# Patient Record
Sex: FEMALE | Race: BLACK OR AFRICAN AMERICAN | NOT HISPANIC OR LATINO | Employment: UNEMPLOYED | URBAN - METROPOLITAN AREA
[De-identification: names, ages, dates, MRNs, and addresses within clinical notes are randomized per-mention and may not be internally consistent; named-entity substitution may affect disease eponyms.]

---
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
## 2022-03-07 ENCOUNTER — TELEPHONE (OUTPATIENT)
Dept: OBSTETRICS AND GYNECOLOGY | Facility: CLINIC | Age: 22
End: 2022-03-07
Payer: MEDICAID

## 2022-03-07 NOTE — TELEPHONE ENCOUNTER
----- Message from Humaira Strong sent at 3/7/2022  2:27 PM CST -----  Patient's mother, Charito, is calling to schedule a nurse appt for the depo shot.  Please call her at [phone].  Thank you!

## 2022-03-09 ENCOUNTER — CLINICAL SUPPORT (OUTPATIENT)
Dept: OBSTETRICS AND GYNECOLOGY | Facility: CLINIC | Age: 22
End: 2022-03-09
Payer: MEDICAID

## 2022-03-09 PROCEDURE — 96372 THER/PROPH/DIAG INJ SC/IM: CPT | Mod: PBBFAC,PN

## 2022-03-09 RX ADMIN — MEDROXYPROGESTERONE ACETATE 150 MG: 150 INJECTION, SUSPENSION INTRAMUSCULAR at 01:03

## 2022-03-09 NOTE — PROGRESS NOTES
Depo-provera 150mg IM Injection given to right upper outer quadrant after two person identification and allergies reviewed injection on time no UPT needed. MD bautista and MAR reviewed patient tolerated well. Dates May 25- June 8, 2022 of next injection given to patient.

## 2022-06-10 ENCOUNTER — CLINICAL SUPPORT (OUTPATIENT)
Dept: OBSTETRICS AND GYNECOLOGY | Facility: CLINIC | Age: 22
End: 2022-06-10
Payer: MEDICAID

## 2022-06-10 DIAGNOSIS — Z30.42 FAMILY PLANNING, DEPO-PROVERA CONTRACEPTION MONITORING/ADMINISTRATION: Primary | ICD-10-CM

## 2022-06-10 LAB
B-HCG UR QL: NEGATIVE
CTP QC/QA: NORMAL

## 2022-06-10 PROCEDURE — 96372 THER/PROPH/DIAG INJ SC/IM: CPT | Mod: PBBFAC,PN

## 2022-06-10 PROCEDURE — 81025 URINE PREGNANCY TEST: CPT | Mod: PBBFAC,PN

## 2022-06-10 RX ADMIN — MEDROXYPROGESTERONE ACETATE 150 MG: 150 INJECTION, SUSPENSION INTRAMUSCULAR at 09:06

## 2022-06-10 NOTE — PROGRESS NOTES
UPT  Negative. Tolerated depo provera injection well. Instructed to return between 8/26 and 9/9 for next injection

## 2022-09-13 ENCOUNTER — CLINICAL SUPPORT (OUTPATIENT)
Dept: OBSTETRICS AND GYNECOLOGY | Facility: CLINIC | Age: 22
End: 2022-09-13
Payer: MEDICAID

## 2022-09-13 DIAGNOSIS — Z30.9 ENCOUNTER FOR CONTRACEPTIVE MANAGEMENT, UNSPECIFIED TYPE: Primary | ICD-10-CM

## 2022-09-13 LAB
B-HCG UR QL: NEGATIVE
CTP QC/QA: YES

## 2022-09-13 PROCEDURE — 96372 THER/PROPH/DIAG INJ SC/IM: CPT | Mod: PBBFAC,PN

## 2022-09-13 PROCEDURE — 81025 URINE PREGNANCY TEST: CPT | Mod: PBBFAC,PN

## 2022-09-13 RX ADMIN — MEDROXYPROGESTERONE ACETATE 150 MG: 150 INJECTION, SUSPENSION INTRAMUSCULAR at 10:09

## 2022-09-13 NOTE — PROGRESS NOTES
Patient arrived early for appointment. Pregnancy test ran. Negative. Allergies verified. Denied any complaints. Advised patient to wait 15 minutes to observe for an allergic reaction. Patient stated no previous allergic reaction to medication. Informed patient on when to return for next shot. Nov. 29 - Dec. 13. Patient verbalized understanding.

## 2022-10-27 ENCOUNTER — TELEPHONE (OUTPATIENT)
Dept: OBSTETRICS AND GYNECOLOGY | Facility: CLINIC | Age: 22
End: 2022-10-27
Payer: MEDICAID

## 2022-10-27 NOTE — TELEPHONE ENCOUNTER
----- Message from Nancy Carter sent at 10/27/2022  1:40 PM CDT -----  Contact: [phone]  Type: Needs Medical Advice  Who Called:  Pt   Symptoms (please be specific):  Abnormal discharge / painful to have a BM   How long has patient had these symptoms:  1 month   Pharmacy name and phone #:    [facility] #[id number] - [place] - [address] [facility] [address]  [facility] [address] [address]  Phone: [phone] Fax: [phone]      Best Call Back Number: [phone]    Additional Information: Pls call back and advise

## 2022-10-27 NOTE — TELEPHONE ENCOUNTER
Spoke with patient and got her an appointment scheduled. Advised pt to try Monistat OTC treatment to help with her symptoms in the mean time. Pt verbalized understanding of appt date/time.

## 2022-11-29 ENCOUNTER — CLINICAL SUPPORT (OUTPATIENT)
Dept: OBSTETRICS AND GYNECOLOGY | Facility: CLINIC | Age: 22
End: 2022-11-29
Payer: MEDICAID

## 2022-11-29 DIAGNOSIS — Z30.9 ENCOUNTER FOR CONTRACEPTIVE MANAGEMENT, UNSPECIFIED TYPE: Primary | ICD-10-CM

## 2022-11-29 PROCEDURE — 96372 THER/PROPH/DIAG INJ SC/IM: CPT | Mod: PBBFAC,PN

## 2022-11-29 RX ADMIN — MEDROXYPROGESTERONE ACETATE 150 MG: 150 INJECTION, SUSPENSION INTRAMUSCULAR at 10:11

## 2022-11-29 NOTE — PROGRESS NOTES
Pt arrived on time for contraception management via depo injection. Pt allergies reviewed, voiced no complaints. Medication adminsitered, pt tolerated well with no adverse reactions noted. Pt advised to return to clinic for next depo dates.  Feb 14-28

## 2023-02-24 ENCOUNTER — CLINICAL SUPPORT (OUTPATIENT)
Dept: OBSTETRICS AND GYNECOLOGY | Facility: CLINIC | Age: 23
End: 2023-02-24
Payer: MEDICAID

## 2023-02-24 PROCEDURE — 96372 THER/PROPH/DIAG INJ SC/IM: CPT | Mod: PBBFAC,PN

## 2023-02-24 PROCEDURE — 99499 UNLISTED E&M SERVICE: CPT | Mod: S$PBB,,, | Performed by: SPECIALIST

## 2023-02-24 PROCEDURE — 99499 NO LOS: ICD-10-PCS | Mod: S$PBB,,, | Performed by: SPECIALIST

## 2023-02-24 RX ADMIN — MEDROXYPROGESTERONE ACETATE 150 MG: 150 INJECTION, SUSPENSION INTRAMUSCULAR at 09:02

## 2023-09-12 ENCOUNTER — OCCUPATIONAL HEALTH (OUTPATIENT)
Dept: URGENT CARE | Facility: CLINIC | Age: 23
End: 2023-09-12
Payer: MEDICAID

## 2023-09-12 DIAGNOSIS — Z13.9 ENCOUNTER FOR SCREENING: Primary | ICD-10-CM

## 2023-09-12 LAB
COLLECTION ONLY: NORMAL
CTP QC/QA: YES
POC 10 PANEL DRUG SCREEN: NEGATIVE

## 2023-09-12 PROCEDURE — 80305 POCT RAPID DRUG SCREEN 10 PANEL: ICD-10-PCS | Mod: S$GLB,,, | Performed by: PHYSICIAN ASSISTANT

## 2023-09-12 PROCEDURE — 80305 DRUG TEST PRSMV DIR OPT OBS: CPT | Mod: S$GLB,,, | Performed by: PHYSICIAN ASSISTANT

## 2023-09-13 ENCOUNTER — OFFICE VISIT (OUTPATIENT)
Dept: OBSTETRICS AND GYNECOLOGY | Facility: CLINIC | Age: 23
End: 2023-09-13
Payer: MEDICAID

## 2023-09-13 DIAGNOSIS — Z30.42 ENCOUNTER FOR SURVEILLANCE OF INJECTABLE CONTRACEPTIVE: Primary | ICD-10-CM

## 2023-09-13 DIAGNOSIS — Z30.9 ENCOUNTER FOR CONTRACEPTIVE MANAGEMENT, UNSPECIFIED TYPE: ICD-10-CM

## 2023-09-13 LAB
B-HCG UR QL: NEGATIVE
CTP QC/QA: YES

## 2023-09-13 PROCEDURE — 96372 THER/PROPH/DIAG INJ SC/IM: CPT | Mod: PBBFAC,PN

## 2023-09-13 PROCEDURE — 99999PBSHW POCT URINE PREGNANCY: Mod: PBBFAC,,,

## 2023-09-13 PROCEDURE — 99499 NO LOS: ICD-10-PCS | Mod: S$PBB,,, | Performed by: SPECIALIST

## 2023-09-13 PROCEDURE — 99999PBSHW PR PBB SHADOW TECHNICAL ONLY FILED TO HB: Mod: PBBFAC,,,

## 2023-09-13 PROCEDURE — 99499 UNLISTED E&M SERVICE: CPT | Mod: S$PBB,,, | Performed by: SPECIALIST

## 2023-09-13 PROCEDURE — 81025 URINE PREGNANCY TEST: CPT | Mod: PBBFAC,PN

## 2023-09-13 PROCEDURE — 99999PBSHW PR PBB SHADOW TECHNICAL ONLY FILED TO HB: ICD-10-PCS | Mod: PBBFAC,,,

## 2023-09-13 RX ADMIN — MEDROXYPROGESTERONE ACETATE 150 MG: 150 INJECTION, SUSPENSION INTRAMUSCULAR at 11:09

## 2023-09-13 NOTE — PROGRESS NOTES
Upt negative, injection given to right upper out quad gluteus. Patient tolerated well. Advised next injection and annual pap due 11/29-12/13/23 verbalized understanding

## 2024-03-19 ENCOUNTER — OFFICE VISIT (OUTPATIENT)
Dept: OBSTETRICS AND GYNECOLOGY | Facility: CLINIC | Age: 24
End: 2024-03-19
Payer: MEDICAID

## 2024-03-19 VITALS
WEIGHT: 141.13 LBS | DIASTOLIC BLOOD PRESSURE: 58 MMHG | BODY MASS INDEX: 25.01 KG/M2 | SYSTOLIC BLOOD PRESSURE: 106 MMHG | HEIGHT: 63 IN

## 2024-03-19 DIAGNOSIS — Z11.3 SCREEN FOR STD (SEXUALLY TRANSMITTED DISEASE): Primary | ICD-10-CM

## 2024-03-19 DIAGNOSIS — N91.2 AMENORRHEA: ICD-10-CM

## 2024-03-19 LAB
B-HCG UR QL: NEGATIVE
CTP QC/QA: YES

## 2024-03-19 PROCEDURE — 99999 PR PBB SHADOW E&M-EST. PATIENT-LVL III: CPT | Mod: PBBFAC,,,

## 2024-03-19 PROCEDURE — 81025 URINE PREGNANCY TEST: CPT | Mod: PBBFAC,PN

## 2024-03-19 PROCEDURE — 99999PBSHW POCT URINE PREGNANCY: Mod: PBBFAC,,,

## 2024-03-19 PROCEDURE — 99213 OFFICE O/P EST LOW 20 MIN: CPT | Mod: PBBFAC,PN

## 2024-03-19 PROCEDURE — 3078F DIAST BP <80 MM HG: CPT | Mod: CPTII,,,

## 2024-03-19 PROCEDURE — 1159F MED LIST DOCD IN RCRD: CPT | Mod: CPTII,,,

## 2024-03-19 PROCEDURE — 87661 TRICHOMONAS VAGINALIS AMPLIF: CPT

## 2024-03-19 PROCEDURE — 3008F BODY MASS INDEX DOCD: CPT | Mod: CPTII,,,

## 2024-03-19 PROCEDURE — 99213 OFFICE O/P EST LOW 20 MIN: CPT | Mod: S$PBB,,,

## 2024-03-19 PROCEDURE — 3074F SYST BP LT 130 MM HG: CPT | Mod: CPTII,,,

## 2024-03-19 NOTE — PROGRESS NOTES
Chief Complaint: STD testing      HPI:   Pt is a 23 y.o. here today desiring STD testing. She has had recent changes in partners and protection not used 100% of the time. Desires screening for possible STDs. Denies vaginal discharge/pain/f/c/n/v. Partner with no reported symptoms. Previously on DMPA, stopped in January. Menstrual cycle has not returned. Declines contraception at this time.  This is the extent of the patient's complaints at this time.     ROS:  GENERAL: No fever, chills, fatigability or weight loss.  VULVAR: No pain, no lesions and no itching.  VAGINAL: No relaxation, no itching, no discharge, no abnormal bleeding and no lesions.  URINARY: No incontinence, no nocturia, no frequency and no dysuria.     PHYSICAL EXAM:  Physical Exam:   Constitutional: She is oriented to person, place, and time. She appears well-developed and well-nourished. No distress.    HENT:   Head: Normocephalic and atraumatic.    Eyes: Conjunctivae and EOM are normal.    Neck: No thyromegaly present.    Cardiovascular:  Normal rate and regular rhythm.             Pulmonary/Chest: Effort normal. No respiratory distress.        Abdominal: Soft. She exhibits no distension. There is no abdominal tenderness.     Genitourinary:    Genitourinary Comments: Deferred, denies any issues               Musculoskeletal: Normal range of motion and moves all extremeties. No tenderness or edema.       Neurological: She is alert and oriented to person, place, and time.    Skin: Skin is warm and dry. No rash noted.    Psychiatric: She has a normal mood and affect. Her behavior is normal. Judgment normal.     History reviewed. No pertinent past medical history.    History reviewed. No pertinent surgical history.    History reviewed. No pertinent family history.    Social History     Socioeconomic History    Marital status: Single   Tobacco Use    Smoking status: Never    Smokeless tobacco: Never   Substance and Sexual Activity    Alcohol use:  Never    Drug use: Never    Sexual activity: Yes     Partners: Male     Birth control/protection: Injection       No current outpatient medications on file.     No current facility-administered medications for this visit.       Review of patient's allergies indicates:  No Known Allergies       OB History    Para Term  AB Living   0 0 0 0 0 0   SAB IAB Ectopic Multiple Live Births   0 0 0 0 0          Assessment/Plan:    Screen for STD (sexually transmitted disease)  -     Trichomonas vaginalis, RNA, Qual, Urine  -     C. trachomatis/N. gonorrhoeae by AMP DNA [facility]; Urine    Amenorrhea  -     POCT Urine Pregnancy        Will call with results. Recommended Condom use 100% to prevent STDs    RTC prn and for annual

## 2024-03-20 ENCOUNTER — HOSPITAL ENCOUNTER (EMERGENCY)
Facility: HOSPITAL | Age: 24
Discharge: HOME | End: 2024-03-20
Attending: EMERGENCY MEDICINE
Payer: MEDICAID

## 2024-03-20 ENCOUNTER — TELEPHONE (OUTPATIENT)
Dept: OBSTETRICS AND GYNECOLOGY | Facility: CLINIC | Age: 24
End: 2024-03-20
Payer: MEDICAID

## 2024-03-20 VITALS
OXYGEN SATURATION: 99 % | HEART RATE: 72 BPM | RESPIRATION RATE: 18 BRPM | BODY MASS INDEX: 24.8 KG/M2 | SYSTOLIC BLOOD PRESSURE: 110 MMHG | WEIGHT: 140 LBS | DIASTOLIC BLOOD PRESSURE: 60 MMHG | TEMPERATURE: 98 F | HEIGHT: 63 IN

## 2024-03-20 DIAGNOSIS — R00.2 PALPITATIONS: ICD-10-CM

## 2024-03-20 LAB
ALBUMIN SERPL BCP-MCNC: 4.5 G/DL (ref 3.5–5.2)
ALP SERPL-CCNC: 67 U/L (ref 55–135)
ALT SERPL W/O P-5'-P-CCNC: 10 U/L (ref 10–44)
ANION GAP SERPL CALC-SCNC: 14 MMOL/L (ref 8–16)
AST SERPL-CCNC: 14 U/L (ref 10–40)
B-HCG UR QL: NEGATIVE
BASOPHILS # BLD AUTO: 0.02 K/UL (ref 0–0.2)
BASOPHILS NFR BLD: 0.3 % (ref 0–1.9)
BILIRUB SERPL-MCNC: 0.9 MG/DL (ref 0.1–1)
BUN SERPL-MCNC: 10 MG/DL (ref 6–20)
CALCIUM SERPL-MCNC: 9.7 MG/DL (ref 8.7–10.5)
CHLORIDE SERPL-SCNC: 106 MMOL/L (ref 95–110)
CO2 SERPL-SCNC: 20 MMOL/L (ref 23–29)
CREAT SERPL-MCNC: 0.8 MG/DL (ref 0.5–1.4)
CTP QC/QA: YES
DIFFERENTIAL METHOD BLD: NORMAL
EOSINOPHIL # BLD AUTO: 0.1 K/UL (ref 0–0.5)
EOSINOPHIL NFR BLD: 0.9 % (ref 0–8)
ERYTHROCYTE [DISTWIDTH] IN BLOOD BY AUTOMATED COUNT: 12.3 % (ref 11.5–14.5)
EST. GFR  (NO RACE VARIABLE): >60 ML/MIN/1.73 M^2
GLUCOSE SERPL-MCNC: 85 MG/DL (ref 70–110)
HCT VFR BLD AUTO: 40.5 % (ref 37–48.5)
HGB BLD-MCNC: 13.6 G/DL (ref 12–16)
IMM GRANULOCYTES # BLD AUTO: 0.01 K/UL (ref 0–0.04)
IMM GRANULOCYTES NFR BLD AUTO: 0.2 % (ref 0–0.5)
LYMPHOCYTES # BLD AUTO: 2.7 K/UL (ref 1–4.8)
LYMPHOCYTES NFR BLD: 46 % (ref 18–48)
MCH RBC QN AUTO: 30.9 PG (ref 27–31)
MCHC RBC AUTO-ENTMCNC: 33.6 G/DL (ref 32–36)
MCV RBC AUTO: 92 FL (ref 82–98)
MONOCYTES # BLD AUTO: 0.3 K/UL (ref 0.3–1)
MONOCYTES NFR BLD: 5.8 % (ref 4–15)
NEUTROPHILS # BLD AUTO: 2.7 K/UL (ref 1.8–7.7)
NEUTROPHILS NFR BLD: 46.8 % (ref 38–73)
NRBC BLD-RTO: 0 /100 WBC
PLATELET # BLD AUTO: 257 K/UL (ref 150–450)
PMV BLD AUTO: 9.4 FL (ref 9.2–12.9)
POTASSIUM SERPL-SCNC: 4.4 MMOL/L (ref 3.5–5.1)
PROT SERPL-MCNC: 7.9 G/DL (ref 6–8.4)
RBC # BLD AUTO: 4.4 M/UL (ref 4–5.4)
SODIUM SERPL-SCNC: 140 MMOL/L (ref 136–145)
TROPONIN I SERPL DL<=0.01 NG/ML-MCNC: <0.006 NG/ML (ref 0–0.03)
TSH SERPL DL<=0.005 MIU/L-ACNC: 1.71 UIU/ML (ref 0.4–4)
WBC # BLD AUTO: 5.82 K/UL (ref 3.9–12.7)

## 2024-03-20 PROCEDURE — 84443 ASSAY THYROID STIM HORMONE: CPT | Performed by: NURSE PRACTITIONER

## 2024-03-20 PROCEDURE — 85025 COMPLETE CBC W/AUTO DIFF WBC: CPT | Performed by: NURSE PRACTITIONER

## 2024-03-20 PROCEDURE — 93005 ELECTROCARDIOGRAM TRACING: CPT

## 2024-03-20 PROCEDURE — 80053 COMPREHEN METABOLIC PANEL: CPT | Performed by: NURSE PRACTITIONER

## 2024-03-20 PROCEDURE — 93010 ELECTROCARDIOGRAM REPORT: CPT | Mod: ,,, | Performed by: GENERAL PRACTICE

## 2024-03-20 PROCEDURE — 84484 ASSAY OF TROPONIN QUANT: CPT | Performed by: NURSE PRACTITIONER

## 2024-03-20 PROCEDURE — 81025 URINE PREGNANCY TEST: CPT | Performed by: NURSE PRACTITIONER

## 2024-03-20 PROCEDURE — 99285 EMERGENCY DEPT VISIT HI MDM: CPT | Mod: 25

## 2024-03-20 PROCEDURE — 36415 COLL VENOUS BLD VENIPUNCTURE: CPT | Performed by: NURSE PRACTITIONER

## 2024-03-20 RX ORDER — FAMOTIDINE 20 MG/1
20 TABLET, FILM COATED ORAL 2 TIMES DAILY
Qty: 20 TABLET | Refills: 0 | Status: SHIPPED | OUTPATIENT
Start: 2024-03-20 | End: 2025-03-20

## 2024-03-20 NOTE — TELEPHONE ENCOUNTER
"----- Message from Mally Pearl NP sent at 3/20/2024 11:25 AM CDT -----  Regarding: FW: Cancellation of Order # [id number]  Please let her know there was not enough urine to run the test for gonorrhea and chlamydia. If she still would like testing, she can come drop off another urine specimen  ----- Message -----  From: [employer]  Sent: 3/20/2024  12:49 AM CDT  To: Mally Pearl NP  Subject: Cancellation of Order # [id number]               Order number [id number] for the procedure C. TRACHOMATIS/N.   GONORRHOEAE BY AMP DNA [[id number]] has been canceled by [employer] [[id number]]. This procedure was ordered by Mally Pearl NP [[id number]] on Mar 19, 2024 for the patient Nilda Ordaz [[id number]]. The reason for cancellation was "Other".    "

## 2024-03-20 NOTE — TELEPHONE ENCOUNTER
----- Message from Annette Ordaz sent at 3/20/2024  4:21 AM CDT -----  Regarding: Lab Client Services  Contact: [phone]  Good Morning,     My name is Annette Ordaz, I work in the Lab Client Services. We had a problem with some lab work on this patient. If someone from your office could call us at [phone] or ext. [phone] that would be great. Anyone in my department can help.      Thank you,

## 2024-03-20 NOTE — TELEPHONE ENCOUNTER
Informed pt that she did not supply enough urine for testing  Pt stated that she would stop by tomorrow to leave a urine sample  Pt verb understanding

## 2024-03-20 NOTE — FIRST PROVIDER EVALUATION
Emergency Department TeleTriage Encounter Note      CHIEF COMPLAINT    Chief Complaint   Patient presents with    Palpitations     Palpitations when she lays down and feels her heart racing at random times        VITAL SIGNS   Initial Vitals [03/20/24 1820]   BP Pulse Resp Temp SpO2   (!) 114/56 77 20 98 °F (36.7 °C) 100 %      MAP       --            ALLERGIES    Review of patient's allergies indicates:  No Known Allergies    PROVIDER TRIAGE NOTE  23-year-old female presents to the ER today with complaints of shortness of breath and lightheadedness symptoms especially with lying down and position changes and a recent bout of diarrhea.  She states her symptoms began approximately 3 days ago.  She states she does not have any chest pain but has palpitations and feels like her heart is racing.  She recently discontinued her Depo-Provera injection and wonders if possibly this could be attributing to her symptoms.  No history of DVT or PE.  Denies any chest pain leg swelling leg pain coagulopathies or other complaints or concerns.  No abdominal pain.  No dysuria.  Last menstrual cycle was approximately 6 months ago.     AAOx3, respirations even and non- labored, stable vitals, normal coloration of skin, sitting upright in triage chair, appears in no acute distress.          ORDERS  Labs Reviewed - No data to display    ED Orders (720h ago, onward)      None              Virtual Visit Note: The provider triage portion of this emergency department evaluation and documentation was performed via [employer], a HIPAA-compliant telemedicine application, in concert with a tele-presenter in the room. A face to face patient evaluation with one of my colleagues will occur once the patient is placed in an emergency department room.      DISCLAIMER: This note was prepared with [employer] voice recognition transcription software. Garbled syntax, mangled pronouns, and other bizarre constructions may be attributed to that software  system.

## 2024-03-21 LAB
OHS QRS DURATION: 86 MS
OHS QTC CALCULATION: 429 MS
SPECIMEN SOURCE: NORMAL
T VAGINALIS RRNA SPEC QL NAA+PROBE: NEGATIVE

## 2024-03-21 NOTE — ED PROVIDER NOTES
Encounter Date: 3/20/2024       History     Chief Complaint   Patient presents with    Palpitations     Palpitations when she lays down and feels her heart racing at random times      HPI patient is a 23-year-old woman who presents emergency department for evaluation of palpitations.  She states she gets a nightly over the past week after laying down.  It is described as feeling her heart beating hard and racing.  She has associated shortness of breath and chest heaviness when this happens.  Prior to this proximally 2 weeks ago she states she thinks she would food poisoning because she had dizziness, vomiting and diarrhea which has subsided.  She does state that she occasionally feels lightheaded with the palpitations.  No history of sudden death in the family.  Last period was approximately 6 months ago.  Last Depo shot was around November.  Review of patient's allergies indicates:  No Known Allergies  No past medical history on file.  No past surgical history on file.  No family history on file.  Social History     Tobacco Use    Smoking status: Never    Smokeless tobacco: Never   Substance Use Topics    Alcohol use: Never    Drug use: Never     Review of Systems   Constitutional:  Negative for fever.   HENT:  Negative for sore throat.    Respiratory:  Positive for shortness of breath.    Cardiovascular:  Positive for chest pain and palpitations.   Gastrointestinal:  Negative for nausea.   Genitourinary:  Negative for dysuria.   Musculoskeletal:  Negative for back pain.   Skin:  Negative for rash.   Neurological:  Positive for dizziness and light-headedness. Negative for weakness.   Hematological:  Does not bruise/bleed easily.       Physical Exam     Initial Vitals [03/20/24 1820]   BP Pulse Resp Temp SpO2   (!) 114/56 77 20 98 °F (36.7 °C) 100 %      MAP       --         Physical Exam    Constitutional: Vital signs are normal. She appears well-developed and well-nourished.  Non-toxic appearance. No distress.    HENT:   Head: Normocephalic and atraumatic.   Eyes: EOM are normal. Pupils are equal, round, and reactive to light.   Neck: Neck supple. No JVD present.   Normal range of motion.  Cardiovascular:  Normal rate, regular rhythm, normal heart sounds and intact distal pulses.     Exam reveals no gallop and no friction rub.       No murmur heard.  Pulmonary/Chest: Breath sounds normal. She has no wheezes. She has no rhonchi. She has no rales.   Abdominal: Abdomen is soft. Bowel sounds are normal. There is no abdominal tenderness. There is no rebound and no guarding.   Musculoskeletal:         General: Normal range of motion.      Cervical back: Normal range of motion and neck supple. No rigidity.     Neurological: She is alert and oriented to person, place, and time. She has normal strength and normal reflexes. No cranial nerve deficit or sensory deficit. She exhibits normal muscle tone. Coordination normal. GCS eye subscore is 4. GCS verbal subscore is 5. GCS motor subscore is 6.   Skin: Skin is warm and dry.   Psychiatric: She has a normal mood and affect. Her speech is normal and behavior is normal. She is not actively hallucinating.         ED Course   Procedures  Labs Reviewed   COMPREHENSIVE METABOLIC PANEL - Abnormal; Notable for the following components:       Result Value    CO2 20 (*)     All other components within normal limits   CBC W/ AUTO DIFFERENTIAL   TSH   TROPONIN I   POCT URINE PREGNANCY     EKG Readings: (Independently Interpreted)   Initial Reading: No STEMI.   Normal sinus rhythm with sinus arrhythmia.  70 beats per minute.  Normal T-waves, normal ST segments       Imaging Results              X-Ray Chest PA And Lateral (Final result)  Result time 03/20/24 18:47:37      Final result by Markell Cedillo MD (03/20/24 18:47:37)                   Narrative:    EXAMINATION:  XR CHEST PA AND LATERAL    CLINICAL HISTORY:  Palpitations    TECHNIQUE:  PA and lateral views of the chest were  performed.    COMPARISON:  None    FINDINGS:  Lungs are clear.  Normal size heart.  Pleural effusion or pneumothorax.  No acute osseous finding.  Chest wall skin piercings.      Electronically signed by: Markell Cedillo  Date:    03/20/2024  Time:    18:47                                     Medications - No data to display  Medical Decision Making  23-year-old woman presents ED for evaluation of palpitations.  She has had some mild associated dizziness, nausea vomiting diarrhea prior to this.  Differential diagnosis includes electrolyte abnormality, arrhythmia, dehydration.  Patient has no tachycardia or hypotension.  She has not hypoxic, have low suspicion for pulmonary embolism.  EKG shows no evidence of malignant cardiac arrhythmias.  She has not anemic with a normal hemoglobin 13.6.  There are no electrolyte abnormalities.  Thyroid is normal.  Troponin is negative.  Chest x-rays interpreted by myself showing no acute abnormalities.  Patient's cell for discharge home.  Symptoms tend to occur at night when she lays down raising suspicion for GERD.  Will start on Pepcid and educated about foods to avoid.  PCP follow-up.  Return precautions discussed.    Amount and/or Complexity of Data Reviewed  Labs:  Decision-making details documented in ED Course.               ED Course as of 03/21/24 0542   Wed Mar 20, 2024   2044 TSH: 1.712 [AS]   2045 hCG Qualitative, Urine: Negative [AS]   2045 Potassium: 4.4 [AS]   2045 Sodium: 140 [AS]   2045 Glucose: 85 [AS]   2045 Creatinine: 0.8 [AS]   2045 Hematocrit: 40.5 [AS]      ED Course User Index  [AS] Wero Pollock MD                           Clinical Impression:  Final diagnoses:  [R00.2] Palpitations          ED Disposition Condition    Discharge Stable          ED Prescriptions       Medication Sig Dispense Start Date End Date Auth. Provider    famotidine (PEPCID) 20 MG tablet Take 1 tablet (20 mg total) by mouth 2 (two) times daily. 20 tablet 3/20/2024 3/20/2025  Wero Pollock MD          Follow-up Information       Follow up With Specialties Details Why Contact Info Additional Information    Jeansonne, Cornel J., MD Pediatrics Schedule an appointment as soon as possible for a visit   [address] [facility] [address] [phone]       [facility]  As needed, If symptoms worsen [address] [place] [address] [address] [place] [place] [address] 1st floor             Wero Pollock MD  03/21/24 8011

## 2024-05-26 ENCOUNTER — HOSPITAL ENCOUNTER (EMERGENCY)
Facility: HOSPITAL | Age: 24
Discharge: HOME | End: 2024-05-26
Attending: STUDENT IN AN ORGANIZED HEALTH CARE EDUCATION/TRAINING PROGRAM
Payer: MEDICAID

## 2024-05-26 VITALS
HEIGHT: 63 IN | RESPIRATION RATE: 18 BRPM | WEIGHT: 140 LBS | HEART RATE: 95 BPM | TEMPERATURE: 99 F | DIASTOLIC BLOOD PRESSURE: 73 MMHG | BODY MASS INDEX: 24.8 KG/M2 | OXYGEN SATURATION: 100 % | SYSTOLIC BLOOD PRESSURE: 121 MMHG

## 2024-05-26 DIAGNOSIS — Z78.9 ALCOHOL USE: ICD-10-CM

## 2024-05-26 DIAGNOSIS — R00.2 PALPITATION: Primary | ICD-10-CM

## 2024-05-26 PROCEDURE — 93005 ELECTROCARDIOGRAM TRACING: CPT

## 2024-05-26 PROCEDURE — 93010 ELECTROCARDIOGRAM REPORT: CPT | Mod: ,,, | Performed by: INTERNAL MEDICINE

## 2024-05-26 PROCEDURE — 99283 EMERGENCY DEPT VISIT LOW MDM: CPT | Mod: 25

## 2024-05-26 NOTE — ED NOTES
"Nilda presents to the ED with palpitations that started today.  Pt stated "I think its because I was drinking all day".  Pt reports that she started drinking around 12pm yesterday and has not had any alcohol today.  No other complaints at this time. Bed low and locked. Pt connected to BP cuff and pulse ox.  Call light within reach.     "

## 2024-05-26 NOTE — ED NOTES
Discharge instructions reviewed with pt. Pt instructed to follow up as advised and return to the ED for worsening of symptoms. Pt awake, alert and ambulatory on ED discharge, no acute distress noted.

## 2024-05-26 NOTE — DISCHARGE INSTRUCTIONS
Follow up primary care physician for further management evaluation.  May benefit from Holter monitor.  Return to ED for any worsening symptoms

## 2024-05-26 NOTE — ED TRIAGE NOTES
Nilda Ordaz is here with palpitations that started tonight and abdominal pain also, states out drinking last night and it feels 'bubbly'

## 2024-05-26 NOTE — ED PROVIDER NOTES
Encounter Date: 5/26/2024       History     Chief Complaint   Patient presents with    Palpitations     And abdominal pain      23-year-old female presents with palpitations.  Associated epigastric discomfort, occurred after drinking alcohol.  Patient reports similar symptoms within the last couple months after drinking alcohol as well.  No associated shortness of breath.    The history is provided by the patient.     Review of patient's allergies indicates:  No Known Allergies  History reviewed. No pertinent past medical history.  History reviewed. No pertinent surgical history.  No family history on file.  Social History     Tobacco Use    Smoking status: Never    Smokeless tobacco: Never   Substance Use Topics    Alcohol use: Never    Drug use: Never     Review of Systems   All other systems reviewed and are negative.      Physical Exam     Initial Vitals [05/26/24 0029]   BP Pulse Resp Temp SpO2   118/69 85 18 98.5 °F (36.9 °C) 100 %      MAP       --         Physical Exam    Nursing note and vitals reviewed.  Constitutional: No distress.   HENT:   Head: Normocephalic.   Eyes: No scleral icterus.   Cardiovascular:  Normal rate.           Pulmonary/Chest: Effort normal. No stridor. No respiratory distress.   Abdominal:   No abdominal tenderness palpation There is no guarding.     Neurological: She is alert.   Skin: No rash noted. No erythema.         ED Course   Procedures  Labs Reviewed - No data to display       Imaging Results    None          Medications - No data to display  Medical Decision Making  23-year-old female presents with palpitations and epigastric discomfort after drinking alcohol.  Within differential diagnosis includes gastritis versus cardiac irritation versus dehydration.  EKG obtained with no significant dysrhythmia.  Less likely within the differential includes any surgical abdomen or any significant cardiopulmonary disorder such as pericarditis, myocarditis or PE.  Also within the  differential diagnosis includes thyroid disorder.  Recommended workup to rule out these life-threatening diagnosis however patient declined.  She does not want to stay for any further workup.  Also within differential diagnosis includes pregnancy although patient states she can not be pregnant.  Patient will be discharged at this time vital signs within normal limits, no respiratory distress or tachypnea and she would not like further workup.  Recommend close PCP follow-up for re-evaluation of possible hopeful monitor and recommend stop drinking alcohol.  Patient voiced understanding and agrees with plan    Amount and/or Complexity of Data Reviewed  ECG/medicine tests: ordered and independent interpretation performed.     Details: EKG rate 96, QRS 78, , normal sinus rhythm                                      Clinical Impression:  Final diagnoses:  [R00.2] Palpitation (Primary)  [Z78.9] Alcohol use          ED Disposition Condition    Discharge Stable          ED Prescriptions    None       Follow-up Information       Follow up With Specialties Details Why Contact Info Additional Information    [facility] - ED Emergency Medicine In 1 day As needed, If symptoms worsen [address] [place] [address] [address] [place] [address] 1st floor    Jeansonne, Cornel J., MD Pediatrics   [address], [place] [address]  [phone]                Charles Smith Jr., DO  05/26/24 0141

## 2024-05-31 LAB
OHS QRS DURATION: 78 MS
OHS QTC CALCULATION: 459 MS

## 2024-09-23 ENCOUNTER — HOSPITAL ENCOUNTER (EMERGENCY)
Facility: HOSPITAL | Age: 24
Discharge: HOME | End: 2024-09-23
Attending: STUDENT IN AN ORGANIZED HEALTH CARE EDUCATION/TRAINING PROGRAM

## 2024-09-23 VITALS
OXYGEN SATURATION: 100 % | DIASTOLIC BLOOD PRESSURE: 64 MMHG | HEART RATE: 78 BPM | SYSTOLIC BLOOD PRESSURE: 114 MMHG | TEMPERATURE: 98 F | RESPIRATION RATE: 17 BRPM

## 2024-09-23 DIAGNOSIS — R11.0 NAUSEA: ICD-10-CM

## 2024-09-23 DIAGNOSIS — R79.89 ABNORMAL LFTS: Primary | ICD-10-CM

## 2024-09-23 DIAGNOSIS — R55 NEAR SYNCOPE: ICD-10-CM

## 2024-09-23 LAB
ALBUMIN SERPL BCP-MCNC: 4.3 G/DL (ref 3.5–5.2)
ALP SERPL-CCNC: 97 U/L (ref 55–135)
ALT SERPL W/O P-5'-P-CCNC: 185 U/L (ref 10–44)
ANION GAP SERPL CALC-SCNC: 9 MMOL/L (ref 8–16)
AST SERPL-CCNC: 81 U/L (ref 10–40)
B-HCG UR QL: NEGATIVE
BASOPHILS # BLD AUTO: 0.02 K/UL (ref 0–0.2)
BASOPHILS NFR BLD: 0.4 % (ref 0–1.9)
BILIRUB SERPL-MCNC: 0.7 MG/DL (ref 0.1–1)
BNP SERPL-MCNC: 11 PG/ML (ref 0–99)
BUN SERPL-MCNC: 9 MG/DL (ref 6–20)
CALCIUM SERPL-MCNC: 9.6 MG/DL (ref 8.7–10.5)
CHLORIDE SERPL-SCNC: 106 MMOL/L (ref 95–110)
CO2 SERPL-SCNC: 23 MMOL/L (ref 23–29)
CREAT SERPL-MCNC: 0.7 MG/DL (ref 0.5–1.4)
CTP QC/QA: YES
D DIMER PPP IA.FEU-MCNC: <0.19 MG/L FEU
DIFFERENTIAL METHOD BLD: NORMAL
EOSINOPHIL # BLD AUTO: 0 K/UL (ref 0–0.5)
EOSINOPHIL NFR BLD: 0.6 % (ref 0–8)
ERYTHROCYTE [DISTWIDTH] IN BLOOD BY AUTOMATED COUNT: 13 % (ref 11.5–14.5)
EST. GFR  (NO RACE VARIABLE): >60 ML/MIN/1.73 M^2
GLUCOSE SERPL-MCNC: 96 MG/DL (ref 70–110)
HCG INTACT+B SERPL-ACNC: <1.2 MIU/ML
HCT VFR BLD AUTO: 39.4 % (ref 37–48.5)
HGB BLD-MCNC: 12.7 G/DL (ref 12–16)
IMM GRANULOCYTES # BLD AUTO: 0.01 K/UL (ref 0–0.04)
IMM GRANULOCYTES NFR BLD AUTO: 0.2 % (ref 0–0.5)
LYMPHOCYTES # BLD AUTO: 1.6 K/UL (ref 1–4.8)
LYMPHOCYTES NFR BLD: 30.5 % (ref 18–48)
MCH RBC QN AUTO: 30 PG (ref 27–31)
MCHC RBC AUTO-ENTMCNC: 32.2 G/DL (ref 32–36)
MCV RBC AUTO: 93 FL (ref 82–98)
MONOCYTES # BLD AUTO: 0.6 K/UL (ref 0.3–1)
MONOCYTES NFR BLD: 10.6 % (ref 4–15)
NEUTROPHILS # BLD AUTO: 3.1 K/UL (ref 1.8–7.7)
NEUTROPHILS NFR BLD: 57.7 % (ref 38–73)
NRBC BLD-RTO: 0 /100 WBC
PLATELET # BLD AUTO: 261 K/UL (ref 150–450)
PMV BLD AUTO: 10 FL (ref 9.2–12.9)
POCT GLUCOSE: 93 MG/DL (ref 70–110)
POTASSIUM SERPL-SCNC: 3.8 MMOL/L (ref 3.5–5.1)
PROT SERPL-MCNC: 8.4 G/DL (ref 6–8.4)
RBC # BLD AUTO: 4.23 M/UL (ref 4–5.4)
SODIUM SERPL-SCNC: 138 MMOL/L (ref 136–145)
TROPONIN I SERPL DL<=0.01 NG/ML-MCNC: <0.006 NG/ML (ref 0–0.03)
WBC # BLD AUTO: 5.37 K/UL (ref 3.9–12.7)

## 2024-09-23 PROCEDURE — 93005 ELECTROCARDIOGRAM TRACING: CPT

## 2024-09-23 PROCEDURE — 36415 COLL VENOUS BLD VENIPUNCTURE: CPT | Performed by: STUDENT IN AN ORGANIZED HEALTH CARE EDUCATION/TRAINING PROGRAM

## 2024-09-23 PROCEDURE — 83880 ASSAY OF NATRIURETIC PEPTIDE: CPT | Performed by: STUDENT IN AN ORGANIZED HEALTH CARE EDUCATION/TRAINING PROGRAM

## 2024-09-23 PROCEDURE — 93010 ELECTROCARDIOGRAM REPORT: CPT | Mod: ,,, | Performed by: INTERNAL MEDICINE

## 2024-09-23 PROCEDURE — 85379 FIBRIN DEGRADATION QUANT: CPT | Performed by: STUDENT IN AN ORGANIZED HEALTH CARE EDUCATION/TRAINING PROGRAM

## 2024-09-23 PROCEDURE — 84702 CHORIONIC GONADOTROPIN TEST: CPT | Performed by: STUDENT IN AN ORGANIZED HEALTH CARE EDUCATION/TRAINING PROGRAM

## 2024-09-23 PROCEDURE — 99284 EMERGENCY DEPT VISIT MOD MDM: CPT | Mod: 25

## 2024-09-23 PROCEDURE — 85025 COMPLETE CBC W/AUTO DIFF WBC: CPT | Performed by: STUDENT IN AN ORGANIZED HEALTH CARE EDUCATION/TRAINING PROGRAM

## 2024-09-23 PROCEDURE — 80053 COMPREHEN METABOLIC PANEL: CPT | Performed by: STUDENT IN AN ORGANIZED HEALTH CARE EDUCATION/TRAINING PROGRAM

## 2024-09-23 PROCEDURE — 84484 ASSAY OF TROPONIN QUANT: CPT | Performed by: STUDENT IN AN ORGANIZED HEALTH CARE EDUCATION/TRAINING PROGRAM

## 2024-09-23 PROCEDURE — 82962 GLUCOSE BLOOD TEST: CPT

## 2024-09-23 PROCEDURE — 81025 URINE PREGNANCY TEST: CPT | Performed by: PHYSICIAN ASSISTANT

## 2024-09-23 NOTE — DISCHARGE INSTRUCTIONS
Pregnancy test pending at this time he had to leave so will follow up in my chart.  Return to ED for any worsening symptoms.  Liver enzymes slightly abnormal further workup will be ultrasound of liver and gallbladder rule out any acute pathology.

## 2024-09-23 NOTE — FIRST PROVIDER EVALUATION
Emergency Department TeleTriage Encounter Note      CHIEF COMPLAINT    Chief Complaint   Patient presents with    Nausea    Emesis    Dizziness     Patient states she has been dizzy since Thursday.       VITAL SIGNS   Initial Vitals [09/23/24 1317]   BP Pulse Resp Temp SpO2   123/61 97 18 97.9 °F (36.6 °C) 100 %      MAP       --            ALLERGIES    Review of patient's allergies indicates:  No Known Allergies    PROVIDER TRIAGE NOTE  This is a teletriage evaluation of a 23 y.o. female presenting to the ED complaining of dizziness. Patient reports dizziness and nausea for 4 days. She denies vomiting or headache.     Patient is alert and oriented. She speaks in complete sentences. She is sitting upright in the chair in no distress.     Initial orders will be placed and care will be transferred to an alternate provider when patient is roomed for a full evaluation. Any additional orders and the final disposition will be determined by that provider.         ORDERS  Labs Reviewed   POCT URINE PREGNANCY   POCT GLUCOSE MONITORING CONTINUOUS       ED Orders (720h ago, onward)      Start Ordered     Status Ordering Provider    09/23/24 1322 09/23/24 1321  POCT glucose  Once         Ordered AC SALVADOR.    09/23/24 1322 09/23/24 1321  POCT urine pregnancy  Once         Ordered AC SALVADOR G.    09/23/24 1322 09/23/24 1321  Orthostatic blood pressure  Once         Ordered AC SALVADOR.              Virtual Visit Note: The provider triage portion of this emergency department evaluation and documentation was performed via [employer], a HIPAA-compliant telemedicine application, in concert with a tele-presenter in the room. A face to face patient evaluation with one of my colleagues will occur once the patient is placed in an emergency department room.      DISCLAIMER: This note was prepared with [employer] voice recognition transcription software. Garbled syntax, mangled pronouns, and other bizarre constructions may be  attributed to that software system.

## 2024-09-23 NOTE — ED PROVIDER NOTES
Encounter Date: 9/23/2024       History     Chief Complaint   Patient presents with    Nausea    Dizziness     Patient states she has been dizzy since Thursday.     23-year-old female presents with nausea, dizziness with positional changes.  Ongoing for the last few days, last menstrual cycle roughly 2 months ago.  Currently not on birth control.  No trauma, fever or chills.    The history is provided by the patient.     Review of patient's allergies indicates:  No Known Allergies  No past medical history on file.  No past surgical history on file.  No family history on file.  Social History     Tobacco Use    Smoking status: Never    Smokeless tobacco: Never   Substance Use Topics    Alcohol use: Never    Drug use: Never     Review of Systems   All other systems reviewed and are negative.      Physical Exam     Initial Vitals [09/23/24 1317]   BP Pulse Resp Temp SpO2   123/61 97 18 97.9 °F (36.6 °C) 100 %      MAP       --         Physical Exam    Nursing note and vitals reviewed.  Constitutional: No distress.   HENT:   Head: Normocephalic.   Eyes: No scleral icterus.   Cardiovascular:  Normal rate.           Pulmonary/Chest: Effort normal. No stridor. No respiratory distress.   Abdominal:   No abdominal tenderness to palpation There is no guarding.     Neurological: She is alert.   Skin: No rash noted. No erythema.         ED Course   Procedures  Labs Reviewed   COMPREHENSIVE METABOLIC PANEL - Abnormal       Result Value    Sodium 138      Potassium 3.8      Chloride 106      CO2 23      Glucose 96      BUN 9      Creatinine 0.7      Calcium 9.6      Total Protein 8.4      Albumin 4.3      Total Bilirubin 0.7      Alkaline Phosphatase 97      AST 81 (*)      (*)     eGFR >60      Anion Gap 9      Narrative:     Release to patient->Immediate   POCT URINE PREGNANCY - Abnormal    POC Preg Test, Ur Negative       Acceptable Yes     CBC W/ AUTO DIFFERENTIAL    WBC 5.37      RBC 4.23       Hemoglobin 12.7      Hematocrit 39.4      MCV 93      MCH 30.0      MCHC 32.2      RDW 13.0      Platelets 261      MPV 10.0      Immature Granulocytes 0.2      Gran # (ANC) 3.1      Immature Grans (Abs) 0.01      Lymph # 1.6      Mono # 0.6      Eos # 0.0      Baso # 0.02      nRBC 0      Gran % 57.7      Lymph % 30.5      Mono % 10.6      Eosinophil % 0.6      Basophil % 0.4      Differential Method Automated      Narrative:     Release to patient->Immediate   TROPONIN I    Troponin I <0.006      Narrative:     Release to patient->Immediate   B-TYPE NATRIURETIC PEPTIDE    BNP 11      Narrative:     Release to patient->Immediate   D DIMER, QUANTITATIVE    D-Dimer <0.19      Narrative:     Release to patient->Immediate   HCG, QUANTITATIVE    HCG Quant <1.2      Narrative:     Release to patient->Immediate   POCT GLUCOSE    POCT Glucose 93     POCT GLUCOSE MONITORING CONTINUOUS          Imaging Results    None          Medications - No data to display  Medical Decision Making  23-year-old female presents with nausea dizziness.  Appears to be near syncopal events with position changes.  Workup initiated, pregnancy test negative, no electrolyte or metabolic abnormality, EKG with no dysrhythmia, D-dimer within normal limits and cardiac workup within limits.  Abnormal LFTs.  No associated abdominal pain.  Patient is currently self-pay through shared decision-making we will defer further imaging with ultrasound and will be discharged with close PCP follow up strict return precautions for any worsening symptoms.  Patient voiced understanding and agrees with plan.  No indication for admission    Amount and/or Complexity of Data Reviewed  Labs: ordered. Decision-making details documented in ED Course.  ECG/medicine tests: ordered and independent interpretation performed.     Details: EKG rate 82, QRS 80, , normal sinus rhythm, no STEMI               ED Course as of 09/23/24 1641   Mon Sep 23, 2024   1435 POCT Glucose:  93 [KB]   1527 hCG Qualitative, Urine: Negative [KB]   1624 Troponin I: <0.006 [KB]   1624 WBC: 5.37 [KB]   1624 Hemoglobin: 12.7 [KB]   1624 Hematocrit: 39.4 [KB]   1624 Sodium: 138 [KB]   1624 Potassium: 3.8 [KB]   1624 Chloride: 106 [KB]   1624 CO2: 23 [KB]   1624 Glucose: 96 [KB]   1624 BUN: 9 [KB]   1624 Creatinine: 0.7 [KB]   1624 Calcium: 9.6 [KB]   1624 Albumin: 4.3 [KB]   1624 ALP: 97 [KB]   1624 AST(!): 81 [KB]   1624 ALT(!): 185 [KB]   1624 eGFR: >60 [KB]   1624 D-Dimer: <0.19 [KB]   1636 BNP: 11 [KB]   1638 Beta HCG Quant: <1.2 [KB]      ED Course User Index  [KB] Charles Smith Jr., DO                           Clinical Impression:  Final diagnoses:  [R79.89] Abnormal LFTs (Primary)  [R11.0] Nausea  [R55] Near syncope          ED Disposition Condition    Discharge Stable          ED Prescriptions    None       Follow-up Information       Follow up With Specialties Details Why Contact Info    Jeansonne, Cornel J., MD Pediatrics   [address] [place] [id number] [phone]               Charles mSith Jr., DO  09/23/24 0441

## 2024-09-24 LAB
OHS QRS DURATION: 80 MS
OHS QTC CALCULATION: 436 MS